# Patient Record
Sex: FEMALE | URBAN - METROPOLITAN AREA
[De-identification: names, ages, dates, MRNs, and addresses within clinical notes are randomized per-mention and may not be internally consistent; named-entity substitution may affect disease eponyms.]

---

## 2022-09-28 ENCOUNTER — APPOINTMENT (OUTPATIENT)
Age: 32
Setting detail: DERMATOLOGY
End: 2022-09-28

## 2022-09-28 DIAGNOSIS — D22 MELANOCYTIC NEVI: ICD-10-CM

## 2022-09-28 DIAGNOSIS — L57.8 OTHER SKIN CHANGES DUE TO CHRONIC EXPOSURE TO NONIONIZING RADIATION: ICD-10-CM

## 2022-09-28 DIAGNOSIS — D18.0 HEMANGIOMA: ICD-10-CM

## 2022-09-28 DIAGNOSIS — L73.8 OTHER SPECIFIED FOLLICULAR DISORDERS: ICD-10-CM

## 2022-09-28 PROBLEM — D23.72 OTHER BENIGN NEOPLASM OF SKIN OF LEFT LOWER LIMB, INCLUDING HIP: Status: ACTIVE | Noted: 2022-09-28

## 2022-09-28 PROBLEM — D23.71 OTHER BENIGN NEOPLASM OF SKIN OF RIGHT LOWER LIMB, INCLUDING HIP: Status: ACTIVE | Noted: 2022-09-28

## 2022-09-28 PROBLEM — D22.5 MELANOCYTIC NEVI OF TRUNK: Status: ACTIVE | Noted: 2022-09-28

## 2022-09-28 PROBLEM — D18.01 HEMANGIOMA OF SKIN AND SUBCUTANEOUS TISSUE: Status: ACTIVE | Noted: 2022-09-28

## 2022-09-28 PROCEDURE — OTHER SUNSCREEN RECOMMENDATIONS: OTHER

## 2022-09-28 PROCEDURE — OTHER REASSURANCE: OTHER

## 2022-09-28 PROCEDURE — 99203 OFFICE O/P NEW LOW 30 MIN: CPT

## 2022-09-28 PROCEDURE — OTHER FOLLOW UP FOR NEXT VISIT: OTHER

## 2022-09-28 PROCEDURE — OTHER COUNSELING: OTHER

## 2022-09-28 PROCEDURE — OTHER TREATMENT REGIMEN: OTHER

## 2022-09-28 ASSESSMENT — LOCATION ZONE DERM
LOCATION ZONE: FACE
LOCATION ZONE: TRUNK
LOCATION ZONE: NECK

## 2022-09-28 ASSESSMENT — LOCATION SIMPLE DESCRIPTION DERM
LOCATION SIMPLE: INFERIOR FOREHEAD
LOCATION SIMPLE: ABDOMEN
LOCATION SIMPLE: RIGHT FOREHEAD
LOCATION SIMPLE: LOWER BACK
LOCATION SIMPLE: RIGHT CHEEK
LOCATION SIMPLE: POSTERIOR NECK
LOCATION SIMPLE: LEFT CHEEK
LOCATION SIMPLE: TRAPEZIAL NECK

## 2022-09-28 ASSESSMENT — LOCATION DETAILED DESCRIPTION DERM
LOCATION DETAILED: MID TRAPEZIAL NECK
LOCATION DETAILED: RIGHT MEDIAL MALAR CHEEK
LOCATION DETAILED: PERIUMBILICAL SKIN
LOCATION DETAILED: RIGHT LATERAL FOREHEAD
LOCATION DETAILED: INFERIOR MID FOREHEAD
LOCATION DETAILED: INFERIOR LUMBAR SPINE
LOCATION DETAILED: LEFT INFERIOR MEDIAL MALAR CHEEK
LOCATION DETAILED: RIGHT FOREHEAD
LOCATION DETAILED: MID POSTERIOR NECK

## 2022-09-28 NOTE — PROCEDURE: REASSURANCE
Detail Level: Generalized
Hide Additional Notes?: No
Additional Note: Normal FBSE, patient reassured no abnormal moles or suspicious lesions.  Follow up if lesions change or become painful, otherwise follow up annually.
Detail Level: Detailed
Additional Note: Patient assured these are benign. Patient is interested in cosmetic electrodessication. Schedule follow up for same time she wants to have sebaceous hyperplasias treated.
Include Location In Plan?: Yes
Additional Notes (Optional): Patient is assured that these are benign. These can be removed cosmetically if needed. I consulted her that it would be a punch excision for the right thigh. Quoted $200 for this.
Detail Level: Simple
Additional Note: Normal FBSE, patient counseled regarding sunscreen and how to use properly.  Follow up if areas change or become painful, otherwise follow up annually.

## 2022-09-28 NOTE — HPI: FULL BODY SKIN EXAMINATION
How Severe Are Your Spot(S)?: moderate
What Type Of Note Output Would You Prefer (Optional)?: Bullet Format
What Is The Reason For Today's Visit?: Full Body Skin Examination
What Is The Reason For Today's Visit? (Being Monitored For X): the development of new lesions
Additional History: Patient presents for full body skin examination. She has a family history of melanoma on her mother’s side. She has previously had moles removed. She is about a year postpartum  and developed some new lesions during pregnancy.  Patient here to establish care. She would like a good comprehensive skin exam. She has seen dermatology previously and gets routine exams. She does have a previous history of dysplastic nevi removed surgically. No previous history of melanoma, basal cell or squamous cell. She is very good about sunscreen use and sun protection

## 2022-09-28 NOTE — PROCEDURE: TREATMENT REGIMEN
Plan: Patient is going to schedule for a cosmetic electrodessication. 3 lesions on face and 1 on back of neck. Quoted $70 for 5 lesions.
Initiate Treatment: Schedule for cosmetic electrodessication
Detail Level: Simple

## 2022-09-28 NOTE — PROCEDURE: FOLLOW UP FOR NEXT VISIT
Scheduled For Follow Up In (Optional): PRN
Other Procedures: electrodessication
Detail Level: Simple

## 2022-10-13 ENCOUNTER — APPOINTMENT (OUTPATIENT)
Age: 32
Setting detail: DERMATOLOGY
End: 2022-10-16

## 2022-10-13 DIAGNOSIS — L73.8 OTHER SPECIFIED FOLLICULAR DISORDERS: ICD-10-CM

## 2022-10-13 PROBLEM — D23.71 OTHER BENIGN NEOPLASM OF SKIN OF RIGHT LOWER LIMB, INCLUDING HIP: Status: ACTIVE | Noted: 2022-10-13

## 2022-10-13 PROCEDURE — OTHER PUNCH EXCISION (COSMETIC): OTHER

## 2022-10-13 PROCEDURE — OTHER TREATMENT REGIMEN: OTHER

## 2022-10-13 PROCEDURE — OTHER ELECTRODESICCATION (COSMETIC): OTHER

## 2022-10-13 PROCEDURE — OTHER FOLLOW UP FOR NEXT VISIT: OTHER

## 2022-10-13 PROCEDURE — OTHER COUNSELING: OTHER

## 2022-10-13 ASSESSMENT — LOCATION ZONE DERM
LOCATION ZONE: NECK
LOCATION ZONE: FACE

## 2022-10-13 ASSESSMENT — LOCATION SIMPLE DESCRIPTION DERM
LOCATION SIMPLE: LEFT CHEEK
LOCATION SIMPLE: POSTERIOR NECK
LOCATION SIMPLE: INFERIOR FOREHEAD

## 2022-10-13 ASSESSMENT — LOCATION DETAILED DESCRIPTION DERM
LOCATION DETAILED: LEFT SUPERIOR CENTRAL MALAR CHEEK
LOCATION DETAILED: LEFT CENTRAL MALAR CHEEK
LOCATION DETAILED: MID POSTERIOR NECK
LOCATION DETAILED: INFERIOR MID FOREHEAD
LOCATION DETAILED: LEFT LATERAL MALAR CHEEK

## 2022-10-13 NOTE — PROCEDURE: ELECTRODESICCATION (COSMETIC)
Price (Use Numbers Only, No Special Characters Or $): 56
Detail Level: Detailed
[place]: 1
Consent: The patient's consent was obtained including but not limited to risks of crusting, scabbing, blistering, scarring, darker or lighter pigmentary change, recurrence, incomplete removal and infection.
Post-Care Instructions: I reviewed with the patient in detail post-care instructions. Patient is to wear sunprotection, and avoid picking at any of the treated lesions. Pt may apply Vaseline to crusted or scabbing areas

## 2022-10-13 NOTE — HPI: SKIN LESION (DERMATOFIBROMA)
How Severe Is Your Dermatofibroma?: moderate
Is This A New Presentation, Or A Follow-Up?: Follow Up Dermatofibroma
Additional History: Patient presents for cosmetic punch excision of dermatofibroma on right thigh.

## 2022-10-13 NOTE — PROCEDURE: PUNCH EXCISION (COSMETIC)
Anesthesia Volume In Cc: 1.5
Size Of Lesion: 0.3
Epidermal Sutures: 4-0 Nylon
Consent was obtained from the patient. The risks and benefits to therapy were discussed in detail. Specifically, the risks of infection, scarring, bleeding, prolonged wound healing, incomplete removal, allergy to anesthesia, nerve injury and recurrence were addressed. Prior to the procedure, the treatment site was clearly identified and confirmed by the patient. All components of Universal Protocol/PAUSE Rule completed.
Detail Level: Detailed
Billing Type: Third-Party Bill
Price (Use Numbers Only, No Special Characters Or $): 200
Punch Size In Mm: 4
Suture Removal: 10 days
Number Of Epidermal Sutures (Optional): 1
Wound Care: Petrolatum
Anesthesia Type: 1% Polocaine
X Depth Of Punch In Cm (Optional): 0
Hemostasis: Electrocautery
Post-Care Instructions: I reviewed with the patient in detail post-care instructions. Patient is to keep the biopsy site dry overnight, and then apply bacitracin twice daily until healed. Patient may apply hydrogen peroxide soaks to remove any crusting.

## 2022-10-13 NOTE — PROCEDURE: TREATMENT REGIMEN
Detail Level: Simple
Plan: 5 lesions on the face and neck were treated with cosmetic electrodessication today. Aftercare instructions were provided. Patient will follow up as needed.
Initiate Treatment: Cosmetic electrodessication
Initiate Treatment: Punch excision
Plan: Cosmetic punch excion was done in office today.  Patient was quoted $200 fee.  Again clinically this appears to be consistent with a benign dermatofibroma. Patient did elect to have this surgically removed. She did want us to send it off for diagnostic testing. We will call her with the official pathology results once we have them